# Patient Record
Sex: MALE | Race: WHITE | NOT HISPANIC OR LATINO
[De-identification: names, ages, dates, MRNs, and addresses within clinical notes are randomized per-mention and may not be internally consistent; named-entity substitution may affect disease eponyms.]

---

## 2023-09-09 ENCOUNTER — NON-APPOINTMENT (OUTPATIENT)
Age: 83
End: 2023-09-09

## 2023-09-09 ENCOUNTER — APPOINTMENT (OUTPATIENT)
Dept: ORTHOPEDIC SURGERY | Facility: CLINIC | Age: 83
End: 2023-09-09
Payer: MEDICARE

## 2023-09-09 ENCOUNTER — INPATIENT (INPATIENT)
Facility: HOSPITAL | Age: 83
LOS: 1 days | Discharge: HOME HEALTH | DRG: 522 | End: 2023-09-11
Attending: STUDENT IN AN ORGANIZED HEALTH CARE EDUCATION/TRAINING PROGRAM | Admitting: INTERNAL MEDICINE
Payer: MEDICARE

## 2023-09-09 VITALS
RESPIRATION RATE: 19 BRPM | DIASTOLIC BLOOD PRESSURE: 75 MMHG | HEART RATE: 87 BPM | TEMPERATURE: 99 F | OXYGEN SATURATION: 97 % | SYSTOLIC BLOOD PRESSURE: 131 MMHG

## 2023-09-09 VITALS — HEIGHT: 73 IN | WEIGHT: 200 LBS | BODY MASS INDEX: 26.51 KG/M2
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
## 2023-09-09 DIAGNOSIS — S72.001A FRACTURE OF UNSPECIFIED PART OF NECK OF RIGHT FEMUR, INITIAL ENCOUNTER FOR CLOSED FRACTURE: ICD-10-CM

## 2023-09-09 PROBLEM — Z00.00 ENCOUNTER FOR PREVENTIVE HEALTH EXAMINATION: Status: ACTIVE | Noted: 2023-09-09

## 2023-09-09 LAB
ALBUMIN SERPL ELPH-MCNC: 4.4 G/DL — SIGNIFICANT CHANGE UP (ref 3.5–5.2)
ALP SERPL-CCNC: 74 U/L — SIGNIFICANT CHANGE UP (ref 30–115)
ALT FLD-CCNC: 30 U/L — SIGNIFICANT CHANGE UP (ref 0–41)
ANION GAP SERPL CALC-SCNC: 14 MMOL/L — SIGNIFICANT CHANGE UP (ref 7–14)
APTT BLD: 24.3 SEC — LOW (ref 27–39.2)
AST SERPL-CCNC: 38 U/L — SIGNIFICANT CHANGE UP (ref 0–41)
BASOPHILS # BLD AUTO: 0.08 K/UL — SIGNIFICANT CHANGE UP (ref 0–0.2)
BASOPHILS NFR BLD AUTO: 0.8 % — SIGNIFICANT CHANGE UP (ref 0–1)
BILIRUB SERPL-MCNC: 0.7 MG/DL — SIGNIFICANT CHANGE UP (ref 0.2–1.2)
BLD GP AB SCN SERPL QL: SIGNIFICANT CHANGE UP
BUN SERPL-MCNC: 23 MG/DL — HIGH (ref 10–20)
CALCIUM SERPL-MCNC: 9.3 MG/DL — SIGNIFICANT CHANGE UP (ref 8.4–10.4)
CHLORIDE SERPL-SCNC: 103 MMOL/L — SIGNIFICANT CHANGE UP (ref 98–110)
CO2 SERPL-SCNC: 22 MMOL/L — SIGNIFICANT CHANGE UP (ref 17–32)
CREAT SERPL-MCNC: 1 MG/DL — SIGNIFICANT CHANGE UP (ref 0.7–1.5)
EGFR: 76 ML/MIN/1.73M2 — SIGNIFICANT CHANGE UP
EOSINOPHIL # BLD AUTO: 0.04 K/UL — SIGNIFICANT CHANGE UP (ref 0–0.7)
EOSINOPHIL NFR BLD AUTO: 0.4 % — SIGNIFICANT CHANGE UP (ref 0–8)
GLUCOSE SERPL-MCNC: 127 MG/DL — HIGH (ref 70–99)
HCT VFR BLD CALC: 41.7 % — LOW (ref 42–52)
HGB BLD-MCNC: 14.2 G/DL — SIGNIFICANT CHANGE UP (ref 14–18)
IMM GRANULOCYTES NFR BLD AUTO: 0.4 % — HIGH (ref 0.1–0.3)
INR BLD: 0.97 RATIO — SIGNIFICANT CHANGE UP (ref 0.65–1.3)
LYMPHOCYTES # BLD AUTO: 0.57 K/UL — LOW (ref 1.2–3.4)
LYMPHOCYTES # BLD AUTO: 6 % — LOW (ref 20.5–51.1)
MCHC RBC-ENTMCNC: 34 PG — HIGH (ref 27–31)
MCHC RBC-ENTMCNC: 34.1 G/DL — SIGNIFICANT CHANGE UP (ref 32–37)
MCV RBC AUTO: 99.8 FL — HIGH (ref 80–94)
MONOCYTES # BLD AUTO: 0.83 K/UL — HIGH (ref 0.1–0.6)
MONOCYTES NFR BLD AUTO: 8.7 % — SIGNIFICANT CHANGE UP (ref 1.7–9.3)
NEUTROPHILS # BLD AUTO: 7.94 K/UL — HIGH (ref 1.4–6.5)
NEUTROPHILS NFR BLD AUTO: 83.7 % — HIGH (ref 42.2–75.2)
NRBC # BLD: 0 /100 WBCS — SIGNIFICANT CHANGE UP (ref 0–0)
PLATELET # BLD AUTO: 175 K/UL — SIGNIFICANT CHANGE UP (ref 130–400)
PMV BLD: 11.1 FL — HIGH (ref 7.4–10.4)
POTASSIUM SERPL-MCNC: 4.6 MMOL/L — SIGNIFICANT CHANGE UP (ref 3.5–5)
POTASSIUM SERPL-SCNC: 4.6 MMOL/L — SIGNIFICANT CHANGE UP (ref 3.5–5)
PROT SERPL-MCNC: 7.4 G/DL — SIGNIFICANT CHANGE UP (ref 6–8)
PROTHROM AB SERPL-ACNC: 11 SEC — SIGNIFICANT CHANGE UP (ref 9.95–12.87)
RBC # BLD: 4.18 M/UL — LOW (ref 4.7–6.1)
RBC # FLD: 13.7 % — SIGNIFICANT CHANGE UP (ref 11.5–14.5)
SODIUM SERPL-SCNC: 139 MMOL/L — SIGNIFICANT CHANGE UP (ref 135–146)
WBC # BLD: 9.5 K/UL — SIGNIFICANT CHANGE UP (ref 4.8–10.8)
WBC # FLD AUTO: 9.5 K/UL — SIGNIFICANT CHANGE UP (ref 4.8–10.8)

## 2023-09-09 PROCEDURE — 88305 TISSUE EXAM BY PATHOLOGIST: CPT

## 2023-09-09 PROCEDURE — 85027 COMPLETE CBC AUTOMATED: CPT

## 2023-09-09 PROCEDURE — 93010 ELECTROCARDIOGRAM REPORT: CPT

## 2023-09-09 PROCEDURE — 97165 OT EVAL LOW COMPLEX 30 MIN: CPT | Mod: GO

## 2023-09-09 PROCEDURE — 80053 COMPREHEN METABOLIC PANEL: CPT

## 2023-09-09 PROCEDURE — 71045 X-RAY EXAM CHEST 1 VIEW: CPT | Mod: 26

## 2023-09-09 PROCEDURE — 97162 PT EVAL MOD COMPLEX 30 MIN: CPT | Mod: GP

## 2023-09-09 PROCEDURE — C1776: CPT

## 2023-09-09 PROCEDURE — 73502 X-RAY EXAM HIP UNI 2-3 VIEWS: CPT | Mod: 26,RT

## 2023-09-09 PROCEDURE — 73502 X-RAY EXAM HIP UNI 2-3 VIEWS: CPT | Mod: RT

## 2023-09-09 PROCEDURE — 88311 DECALCIFY TISSUE: CPT

## 2023-09-09 PROCEDURE — 73552 X-RAY EXAM OF FEMUR 2/>: CPT | Mod: 26,RT

## 2023-09-09 PROCEDURE — 36415 COLL VENOUS BLD VENIPUNCTURE: CPT

## 2023-09-09 PROCEDURE — 99222 1ST HOSP IP/OBS MODERATE 55: CPT

## 2023-09-09 PROCEDURE — 99203 OFFICE O/P NEW LOW 30 MIN: CPT

## 2023-09-09 PROCEDURE — 73562 X-RAY EXAM OF KNEE 3: CPT | Mod: 26,RT

## 2023-09-09 PROCEDURE — 99285 EMERGENCY DEPT VISIT HI MDM: CPT

## 2023-09-09 RX ORDER — HEPARIN SODIUM 5000 [USP'U]/ML
5000 INJECTION INTRAVENOUS; SUBCUTANEOUS EVERY 8 HOURS
Refills: 0 | Status: DISCONTINUED | OUTPATIENT
Start: 2023-09-09 | End: 2023-09-10

## 2023-09-09 RX ORDER — MORPHINE SULFATE 50 MG/1
2 CAPSULE, EXTENDED RELEASE ORAL EVERY 6 HOURS
Refills: 0 | Status: DISCONTINUED | OUTPATIENT
Start: 2023-09-09 | End: 2023-09-10

## 2023-09-09 RX ORDER — THIAMINE MONONITRATE (VIT B1) 100 MG
100 TABLET ORAL EVERY 24 HOURS
Refills: 0 | Status: DISCONTINUED | OUTPATIENT
Start: 2023-09-09 | End: 2023-09-10

## 2023-09-09 RX ORDER — FOLIC ACID 0.8 MG
1 TABLET ORAL DAILY
Refills: 0 | Status: DISCONTINUED | OUTPATIENT
Start: 2023-09-09 | End: 2023-09-10

## 2023-09-09 RX ORDER — OXYCODONE AND ACETAMINOPHEN 5; 325 MG/1; MG/1
1 TABLET ORAL EVERY 6 HOURS
Refills: 0 | Status: DISCONTINUED | OUTPATIENT
Start: 2023-09-09 | End: 2023-09-10

## 2023-09-09 RX ORDER — SODIUM CHLORIDE 9 MG/ML
1000 INJECTION INTRAMUSCULAR; INTRAVENOUS; SUBCUTANEOUS
Refills: 0 | Status: DISCONTINUED | OUTPATIENT
Start: 2023-09-09 | End: 2023-09-10

## 2023-09-09 NOTE — ED PROVIDER NOTE - CLINICAL SUMMARY MEDICAL DECISION MAKING FREE TEXT BOX
82-year-old male with no significant past medical history presents after fall last night onto right hip.  Went to urgent care this morning and found to have hip fracture.  Has pain in his right hip and thigh but nowhere else.  Denies hitting his head or anticoagulation use.  No headache, neck pain, back pain, chest pain, flank pain, abdominal pain, shortness of breath, arm pain or pain below his knee.  On exam nontoxic, vital signs noted, normocephalic, atraumatic, C-spine nontender and remainder spine nontender no step-offs, lungs clear bilaterally, heart regular no murmurs, abdomen benign, chest wall and flank nontender, right elbow with superficial abrasion but full range of motion nontender and neurovascular intact distally.  Mild tenderness proximal right femur with no expanding hematoma or severe deformity.  2+ DP pulse distally.  No tenderness to distal thigh or knee.  X-ray with proximal femur fracture.  No other fractures on my independent interpretation.  Fast negative.  Head, C-spine, chest, abdomen cleared clinically.  EKG normal sinus rhythm, normal axis, nonspecific inferior T wave/ST abnormality, no ST depression or elevation, poor R wave progression, normal intervals.  Discussed with orthopedics and they recommend admitting to [facility] for surgery tomorrow

## 2023-09-09 NOTE — ED ADULT NURSE NOTE - CHIEF COMPLAINT QUOTE
pt bibems for fall last night at 8pm, pt states he fell down 2 steps. went to urgent care today and was told he has a femur fx. Not on AC, denies HT or LOC. Unable to ambulate after fall.

## 2023-09-09 NOTE — DISCUSSION/SUMMARY
[de-identified] : I reviewed the x-ray findings with the patient at length.  At this time, an ambulette was called and it was recommended that the patient go to [facility] for admission for an ORIF of the right hip.  The patient understands that he should currently remain nonweightbearing in the right lower extremity.  We did discuss the procedures as well as the recovery and risks and benefits.  The patient was advised that he will meet with the surgeon in the hospital and have more details given as well.  He will follow-up in our office as a postop patient after his procedure is completed.  The family and patient verbalized understanding and consent to this treatment plan.  All of their questions were answered.

## 2023-09-09 NOTE — ED ADULT NURSE NOTE - OBJECTIVE STATEMENT
patient sent from urgent care for right side femur fx. pt states he tripped on the steps last night and fell onto the floor on his right side. had difficulty walking. denies any head trauma/loc/bt

## 2023-09-09 NOTE — ED PROVIDER NOTE - PHYSICAL EXAMINATION
CONSTITUTIONAL:  NAD;   SKIN:  warm, dry;   HEAD:  NCAT;   EYES:  NL inspection; PERRLA, EOMI, no periorbital ecchymosis  ENT:  MMM; no nasal septal hematoma, no epistaxis, no cruz sign bilaterally,  NECK: supple; normal ROM; no C/T/L spine midline TTP, step-offs, or deformities  CARD:  RRR;   RESP:  CTAB; distal pulses 2+ X4 extremities  ABD:  S/NT, no R/G;   MSK: + mild TTP to inner aspect of right proximal upper leg with associated edema, + right knee anterior aspect small bruise with no TTP, no deformity, otherwise no extremity TTP/loss of ROM/injury/deformity;   NEURO: A&Ox3; CN II - XII intact; strength 5/5 and sensation grossly intact x4 extremities except for right hip (limited by pain); no somnolence  PSYCH:  cooperative, appropriate;

## 2023-09-09 NOTE — DATA REVIEWED
[FreeTextEntry1] : X-rays were obtained today of the patient's right hip and pelvis.  It shows an impacted/displaced femoral neck fracture.  These findings were discussed with the patient and his son.

## 2023-09-09 NOTE — CONSULT NOTE ADULT - ASSESSMENT
Orthopaedic Surgery Consult Note    81yo Male no PMH presents with right femoral neck fracture s/p mechanical  fall. Patient fell on his right hip last night while walking down stairs. Landed on right hip, immediate groin pain, swelling, inability to ambulate. Denies head trauma, LOC, prodromal symptoms. Denies pain anywhere else. Fairly active for age, states he power walks. Ambulates without any assistive devices. Denies numbness and tingling.     PMH/PSH  MEWS Score    No pertinent past medical history    Closed displaced fracture of right femoral neck    FEMUR FRACTURE    Fall at home, initial encounter    SysAdmin_VisitLink        Medications      Home Medications:        Allergies  No Known Allergies        T(C): 37.2 (09-09-23 @ 14:29), Max: 37.2 (09-09-23 @ 14:29)  HR: 87 (09-09-23 @ 14:29) (87 - 87)  BP: 131/75 (09-09-23 @ 14:29) (131/75 - 131/75)  RR: 19 (09-09-23 @ 14:29) (19 - 19)  SpO2: 97% (09-09-23 @ 14:29) (97% - 97%)    P/E:  AOx3, NAD  Non-labored breathing    RLE  Skin intact  Shortened, externally rotated  TTP over groin  ROM hip limited by tri  Compartments soft and compressible, no pain w/ passive stretch of digits  SILT SP/DP/T/Sural/Saph  Firing TA/EHL/FHL/GS  DP pulse 2+, cap refill <2        Labs                        14.2   9.50  )-----------( 175      ( 09 Sep 2023 15:03 )             41.7     09-09    139  |  103  |  23<H>  ----------------------------<  127<H>  4.6   |  22  |  1.0    Ca    9.3      09 Sep 2023 15:03    TPro  7.4  /  Alb  4.4  /  TBili  0.7  /  DBili  x   /  AST  38  /  ALT  30  /  AlkPhos  74  09-09    LIVER FUNCTIONS - ( 09 Sep 2023 15:03 )  Alb: 4.4 g/dL / Pro: 7.4 g/dL / ALK PHOS: 74 U/L / ALT: 30 U/L / AST: 38 U/L / GGT: x           PT/INR - ( 09 Sep 2023 15:03 )   PT: 11.00 sec;   INR: 0.97 ratio         PTT - ( 09 Sep 2023 15:03 )  PTT:24.3 sec    Imaging:  XR R hip  displaced, femoral neck farctute    A/P:  81yo Male w/ right displaced femoral neck fracture. Patient indicated for hemiarthroplasty vs total hip arthroplasty    Discussed with Dr. Garay that he will perform surgery tomorrow at [facility]. Please [place]  Admit to medicine/trauma  Weight bearing: NWB RLE  Pain control  Ice/elevation  NPO at midnight with IV fluids  Please obtain preop labs: bmp, cbc, coags, type and screen x2, ekg, cxr  Add on for Dr. Garay 9/10 for right hip hemiarthroplasty vs YULISA

## 2023-09-09 NOTE — ED PROVIDER NOTE - CARE PLAN
1 Principal Discharge DX:	Closed displaced fracture of right femoral neck  Secondary Diagnosis:	Fall at home, initial encounter

## 2023-09-09 NOTE — PHYSICAL EXAM
[de-identified] : On examination of his right lower extremity, the patient is currently sitting in a wheelchair, and is unable to get up onto the exam table secondary to pain.  He has no obvious edema, ecchymosis, or deformity noted along his right groin.  Skin is intact.  He has point tenderness with deep palpation into the right groin.  He has some tenderness palpation along the medial side of his thigh.  He has no direct tenderness to palpation along the anterior thigh or lateral thigh.  He has no tenderness palpation along his knee.  He has difficulty actively straight leg raising secondary to pain along his groin.  He can extend his knee without any discomfort in the knee, but no discomfort along his groin.  He has pain with any attempted passive range of motion of the right hip while sitting in the wheelchair.  His hip exam was limited secondary to pain and his inability to get up onto the exam table.  He otherwise had no tenderness palpation along the femur.  No tenderness along the patella facets.  No tenderness along the tibial plateau.  Calf was soft and nontender.  No tenderness along the fibular head.  Neurovascular intact distally.

## 2023-09-09 NOTE — HISTORY OF PRESENT ILLNESS
[de-identified] : Patient presents today for evaluation of his right hip/right lower extremity.  He states that yesterday, he missed a step, and fell down 1 step.  He landed on his right knee/right thigh, but he also recalls landing in a somewhat of a split position, resulting in a pull on his right groin. Since then, its been difficult for him to bear full weight on the right lower extremity and move his right leg.  He denies any previous problems in the right lower extremity.

## 2023-09-09 NOTE — H&P ADULT - NSHPLABSRESULTS_GEN_ALL_CORE
14.2   9.50  )-----------( 175      ( 09 Sep 2023 15:03 )             41.7   09-09    139  |  103  |  23<H>  ----------------------------<  127<H>  4.6   |  22  |  1.0    Ca    9.3      09 Sep 2023 15:03    TPro  7.4  /  Alb  4.4  /  TBili  0.7  /  DBili  x   /  AST  38  /  ALT  30  /  AlkPhos  74  09-09    PT/INR - ( 09 Sep 2023 15:03 )   PT: 11.00 sec;   INR: 0.97 ratio    PTT - ( 09 Sep 2023 15:03 )  PTT:24.3 sec  EKG - sinus w/ NS changes, age indeterminate infarct     < from: Xray Femur 2 Views, Right (09.09.23 @ 15:48) >  Impression:  Acute fracture right femoral neck.  --- End of Report ---  ELINA SINGH MD; Attending Radiologist  This document has been electronically signed. Sep  9 2023  4:36PM  < end of copied text >    < from: Xray Chest 1 View- PORTABLE-Urgent (Xray Chest 1 View- PORTABLE-Urgent .) (09.09.23 @ 15:48) >  Impression:  No radiographic evidence of cardiopulmonary disease.  --- End of Report ---  ELINA SINGH MD; Attending Radiologist  This document has been electronically signed. Sep  9 2023  4:37PM    < end of copied text > 14.2   9.50  )-----------( 175      ( 09 Sep 2023 15:03 )             41.7   09-09    139  |  103  |  23<H>  ----------------------------<  127<H>  4.6   |  22  |  1.0    Ca    9.3      09 Sep 2023 15:03    TPro  7.4  /  Alb  4.4  /  TBili  0.7  /  DBili  x   /  AST  38  /  ALT  30  /  AlkPhos  74  09-09    PT/INR - ( 09 Sep 2023 15:03 )   PT: 11.00 sec;   INR: 0.97 ratio    PTT - ( 09 Sep 2023 15:03 )  PTT:24.3 sec  EKG - NSR @78 w/ NS changes,?? age undetermined infarct -Q waves    < from: Xray Femur 2 Views, Right (09.09.23 @ 15:48) >  Impression:  Acute fracture right femoral neck.  --- End of Report ---  ELINA SINGH MD; Attending Radiologist  This document has been electronically signed. Sep  9 2023  4:36PM  < end of copied text >    < from: Xray Chest 1 View- PORTABLE-Urgent (Xray Chest 1 View- PORTABLE-Urgent .) (09.09.23 @ 15:48) >  Impression:  No radiographic evidence of cardiopulmonary disease.  --- End of Report ---  ELINA SINGH MD; Attending Radiologist  This document has been electronically signed. Sep  9 2023  4:37PM    < end of copied text >

## 2023-09-09 NOTE — H&P ADULT - HISTORY OF PRESENT ILLNESS
· HPI Objective Statement: Patient is an 82M with no PMHx sent to ED via EMS from outpatient orthopedic office for evaluation and management of right femoral neck fracture, displaced and impacted. Patient states that at 2015 last night he was walking down the stairs inside his house with the lights off and got to the 2nd to last step when he caught his toe, tripped, and fell, landing on his right side. Patient not hit his head, no LOC, no AC use, no upper extremity or left lower extremity injury. Patient reports having pain to his right groin/proximal thigh immediately afterward but states that he was able to ambulate back up the stairs into bed with considerable assistance from his wife. When he woke today the pain was worse and he was unable to ambulate, prompting him to present to outside orthopedic office where x-rays were performed showing the fracture. No other injuries, patient denies pain except when he is moving.

## 2023-09-09 NOTE — ED ADULT TRIAGE NOTE - CHIEF COMPLAINT QUOTE
pt bibems for fall last night at 8pm, pt states he fell down 2 steps. went to urgent care today and was told he has a femur fx. Not on AC, denies HT or LOC. pt bibems for fall last night at 8pm, pt states he fell down 2 steps. went to urgent care today and was told he has a femur fx. Not on AC, denies HT or LOC. Unable to ambulate after fall.

## 2023-09-09 NOTE — ED PROVIDER NOTE - OBJECTIVE STATEMENT
Patient is an 82M with no PMHx sent to ED via EMS from outpatient orthopedic office for evaluation and management of right femoral neck fracture, displaced and impacted. Patient states that at 2015 last night he was walking down the stairs inside his house with the lights off and got to the 2nd to last step when he caught his toe, tripped, and fell, landing on his right side. Patient not hit his head, no LOC, no AC use, no upper extremity or left lower extremity injury. Patient reports having pain to his right groin/proximal thigh immediately afterward but states that he was able to ambulate back up the stairs into bed with considerable assistance from his wife. When he woke today the pain was worse and he was unable to ambulate, prompting him to present to outside orthopedic office where x-rays were performed showing the fracture. No other injuries, patient denies pain except when he is moving.

## 2023-09-09 NOTE — ED PROVIDER NOTE - PROGRESS NOTE DETAILS
TD: Consulted orthopedics, discussed patient's case with resident who states he will discuss with attending and then call back.

## 2023-09-09 NOTE — H&P ADULT - ASSESSMENT
81 y/o male w/ No PMH but w/ daily alcohol use being admitted for treatment of R femoral neck fracture. Patient seen by the Orthopaedic surgeon today in his office and referral to the ED for admission and surgery   RCRI - Class I -3.9 % 30-day risk of death, MI, or cardiac arrest  Patient is at low risk for pal-op event while undergoing a moderate risk procedure.   R hip fracture - May proceed to OR  -NPO after MN   -IV fluid - maintenance   -PRN pain Rx  -DVT prophylaxis     Suspected alcohol use disorder w/o signs or symptoms of intoxication or withdrawal   -patient counselled ref recommended acceptable limits of consumption (CAGE- neg)  -MVI/Thiamine / Folic acid     DVT prophylaxis     Patient declined to have next of kin/family contacted - 'they are already sleeping'    Case d/w [facility] Site Hospitalist / ED Physician / Transfer center/ Nurse gave report to facilitate transfer   to [facility].

## 2023-09-09 NOTE — ED ADULT NURSE NOTE - NSFALLHARMRISKINTERV_ED_ALL_ED
Assistance OOB with selected safe patient handling equipment if applicable/Assistance with ambulation/Communicate risk of Fall with Harm to all staff, patient, and family/Monitor gait and stability/Provide visual cue: red socks, yellow wristband, yellow gown, etc/Reinforce activity limits and safety measures with patient and family/Bed in lowest position, wheels locked, appropriate side rails in place/Call bell, personal items and telephone in reach/Instruct patient to call for assistance before getting out of bed/chair/stretcher/Non-slip footwear applied when patient is off stretcher/Treichlers to call system/Physically safe environment - no spills, clutter or unnecessary equipment/Purposeful Proactive Rounding/Room/bathroom lighting operational, light cord in reach

## 2023-09-10 ENCOUNTER — RESULT REVIEW (OUTPATIENT)
Age: 83
End: 2023-09-10

## 2023-09-10 LAB
ALBUMIN SERPL ELPH-MCNC: 4.3 G/DL — SIGNIFICANT CHANGE UP (ref 3.5–5.2)
ALP SERPL-CCNC: 68 U/L — SIGNIFICANT CHANGE UP (ref 30–115)
ALT FLD-CCNC: 24 U/L — SIGNIFICANT CHANGE UP (ref 0–41)
ANION GAP SERPL CALC-SCNC: 14 MMOL/L — SIGNIFICANT CHANGE UP (ref 7–14)
AST SERPL-CCNC: 38 U/L — SIGNIFICANT CHANGE UP (ref 0–41)
BILIRUB SERPL-MCNC: 0.8 MG/DL — SIGNIFICANT CHANGE UP (ref 0.2–1.2)
BUN SERPL-MCNC: 17 MG/DL — SIGNIFICANT CHANGE UP (ref 10–20)
CALCIUM SERPL-MCNC: 8.8 MG/DL — SIGNIFICANT CHANGE UP (ref 8.4–10.5)
CHLORIDE SERPL-SCNC: 105 MMOL/L — SIGNIFICANT CHANGE UP (ref 98–110)
CO2 SERPL-SCNC: 20 MMOL/L — SIGNIFICANT CHANGE UP (ref 17–32)
CREAT SERPL-MCNC: 0.8 MG/DL — SIGNIFICANT CHANGE UP (ref 0.7–1.5)
EGFR: 89 ML/MIN/1.73M2 — SIGNIFICANT CHANGE UP
GLUCOSE SERPL-MCNC: 106 MG/DL — HIGH (ref 70–99)
HCT VFR BLD CALC: 43.2 % — SIGNIFICANT CHANGE UP (ref 42–52)
HGB BLD-MCNC: 14.1 G/DL — SIGNIFICANT CHANGE UP (ref 14–18)
MCHC RBC-ENTMCNC: 32.6 G/DL — SIGNIFICANT CHANGE UP (ref 32–37)
MCHC RBC-ENTMCNC: 32.6 PG — HIGH (ref 27–31)
MCV RBC AUTO: 99.8 FL — HIGH (ref 80–94)
NRBC # BLD: 0 /100 WBCS — SIGNIFICANT CHANGE UP (ref 0–0)
PLATELET # BLD AUTO: 147 K/UL — SIGNIFICANT CHANGE UP (ref 130–400)
PMV BLD: 11.6 FL — HIGH (ref 7.4–10.4)
POTASSIUM SERPL-MCNC: 5.4 MMOL/L — HIGH (ref 3.5–5)
POTASSIUM SERPL-SCNC: 5.4 MMOL/L — HIGH (ref 3.5–5)
PROT SERPL-MCNC: 6.5 G/DL — SIGNIFICANT CHANGE UP (ref 6–8)
RBC # BLD: 4.33 M/UL — LOW (ref 4.7–6.1)
RBC # FLD: 13.7 % — SIGNIFICANT CHANGE UP (ref 11.5–14.5)
SODIUM SERPL-SCNC: 139 MMOL/L — SIGNIFICANT CHANGE UP (ref 135–146)
WBC # BLD: 7.4 K/UL — SIGNIFICANT CHANGE UP (ref 4.8–10.8)
WBC # FLD AUTO: 7.4 K/UL — SIGNIFICANT CHANGE UP (ref 4.8–10.8)

## 2023-09-10 PROCEDURE — 88311 DECALCIFY TISSUE: CPT | Mod: 26

## 2023-09-10 PROCEDURE — 27130 TOTAL HIP ARTHROPLASTY: CPT | Mod: RT

## 2023-09-10 PROCEDURE — 88305 TISSUE EXAM BY PATHOLOGIST: CPT | Mod: 26

## 2023-09-10 PROCEDURE — 99232 SBSQ HOSP IP/OBS MODERATE 35: CPT

## 2023-09-10 PROCEDURE — 27130 TOTAL HIP ARTHROPLASTY: CPT | Mod: AS,RT

## 2023-09-10 RX ORDER — ENOXAPARIN SODIUM 100 MG/ML
40 INJECTION SUBCUTANEOUS EVERY 12 HOURS
Refills: 0 | Status: DISCONTINUED | OUTPATIENT
Start: 2023-09-10 | End: 2023-09-11

## 2023-09-10 RX ORDER — KETOROLAC TROMETHAMINE 30 MG/ML
15 SYRINGE (ML) INJECTION EVERY 6 HOURS
Refills: 0 | Status: DISCONTINUED | OUTPATIENT
Start: 2023-09-10 | End: 2023-09-11

## 2023-09-10 RX ORDER — POLYETHYLENE GLYCOL 3350 17 G/17G
17 POWDER, FOR SOLUTION ORAL AT BEDTIME
Refills: 0 | Status: DISCONTINUED | OUTPATIENT
Start: 2023-09-10 | End: 2023-09-11

## 2023-09-10 RX ORDER — SENNA PLUS 8.6 MG/1
2 TABLET ORAL AT BEDTIME
Refills: 0 | Status: DISCONTINUED | OUTPATIENT
Start: 2023-09-10 | End: 2023-09-10

## 2023-09-10 RX ORDER — POLYETHYLENE GLYCOL 3350 17 G/17G
17 POWDER, FOR SOLUTION ORAL DAILY
Refills: 0 | Status: DISCONTINUED | OUTPATIENT
Start: 2023-09-10 | End: 2023-09-10

## 2023-09-10 RX ORDER — ACETAMINOPHEN 500 MG
650 TABLET ORAL EVERY 6 HOURS
Refills: 0 | Status: DISCONTINUED | OUTPATIENT
Start: 2023-09-10 | End: 2023-09-11

## 2023-09-10 RX ORDER — ONDANSETRON 8 MG/1
4 TABLET, FILM COATED ORAL EVERY 6 HOURS
Refills: 0 | Status: DISCONTINUED | OUTPATIENT
Start: 2023-09-10 | End: 2023-09-11

## 2023-09-10 RX ORDER — TRAMADOL HYDROCHLORIDE 50 MG/1
50 TABLET ORAL EVERY 4 HOURS
Refills: 0 | Status: DISCONTINUED | OUTPATIENT
Start: 2023-09-10 | End: 2023-09-11

## 2023-09-10 RX ORDER — HYDROMORPHONE HYDROCHLORIDE 2 MG/ML
0.5 INJECTION INTRAMUSCULAR; INTRAVENOUS; SUBCUTANEOUS
Refills: 0 | Status: DISCONTINUED | OUTPATIENT
Start: 2023-09-10 | End: 2023-09-10

## 2023-09-10 RX ORDER — PANTOPRAZOLE SODIUM 20 MG/1
40 TABLET, DELAYED RELEASE ORAL
Refills: 0 | Status: DISCONTINUED | OUTPATIENT
Start: 2023-09-10 | End: 2023-09-11

## 2023-09-10 RX ORDER — SODIUM CHLORIDE 9 MG/ML
1000 INJECTION INTRAMUSCULAR; INTRAVENOUS; SUBCUTANEOUS
Refills: 0 | Status: DISCONTINUED | OUTPATIENT
Start: 2023-09-10 | End: 2023-09-11

## 2023-09-10 RX ORDER — INFLUENZA VIRUS VACCINE 15; 15; 15; 15 UG/.5ML; UG/.5ML; UG/.5ML; UG/.5ML
0.7 SUSPENSION INTRAMUSCULAR ONCE
Refills: 0 | Status: DISCONTINUED | OUTPATIENT
Start: 2023-09-10 | End: 2023-09-10

## 2023-09-10 RX ORDER — SODIUM CHLORIDE 9 MG/ML
1000 INJECTION, SOLUTION INTRAVENOUS
Refills: 0 | Status: DISCONTINUED | OUTPATIENT
Start: 2023-09-10 | End: 2023-09-10

## 2023-09-10 RX ORDER — SENNA PLUS 8.6 MG/1
2 TABLET ORAL AT BEDTIME
Refills: 0 | Status: DISCONTINUED | OUTPATIENT
Start: 2023-09-10 | End: 2023-09-11

## 2023-09-10 RX ORDER — OXYCODONE HYDROCHLORIDE 5 MG/1
5 TABLET ORAL EVERY 4 HOURS
Refills: 0 | Status: DISCONTINUED | OUTPATIENT
Start: 2023-09-10 | End: 2023-09-11

## 2023-09-10 RX ORDER — MAGNESIUM HYDROXIDE 400 MG/1
30 TABLET, CHEWABLE ORAL DAILY
Refills: 0 | Status: DISCONTINUED | OUTPATIENT
Start: 2023-09-10 | End: 2023-09-11

## 2023-09-10 RX ORDER — CELECOXIB 200 MG/1
200 CAPSULE ORAL EVERY 12 HOURS
Refills: 0 | Status: DISCONTINUED | OUTPATIENT
Start: 2023-09-11 | End: 2023-09-11

## 2023-09-10 RX ORDER — CEFAZOLIN SODIUM 1 G
2000 VIAL (EA) INJECTION EVERY 8 HOURS
Refills: 0 | Status: COMPLETED | OUTPATIENT
Start: 2023-09-10 | End: 2023-09-11

## 2023-09-10 RX ADMIN — SODIUM CHLORIDE 75 MILLILITER(S): 9 INJECTION INTRAMUSCULAR; INTRAVENOUS; SUBCUTANEOUS at 06:32

## 2023-09-10 RX ADMIN — ENOXAPARIN SODIUM 40 MILLIGRAM(S): 100 INJECTION SUBCUTANEOUS at 17:50

## 2023-09-10 RX ADMIN — Medication 100 MILLIGRAM(S): at 21:21

## 2023-09-10 RX ADMIN — Medication 15 MILLIGRAM(S): at 17:50

## 2023-09-10 RX ADMIN — Medication 100 MILLIGRAM(S): at 06:32

## 2023-09-10 RX ADMIN — SODIUM CHLORIDE 100 MILLILITER(S): 9 INJECTION INTRAMUSCULAR; INTRAVENOUS; SUBCUTANEOUS at 17:52

## 2023-09-10 RX ADMIN — Medication 650 MILLIGRAM(S): at 17:50

## 2023-09-10 NOTE — CHART NOTE - NSCHARTNOTEFT_GEN_A_CORE
PACU ANESTHESIA ADMISSION NOTE      Procedure: right total hip arthroplasty    ____  Intubated  TV:______       Rate: ______      FiO2: ______    ___x_  Patent Airway    __x__  Full return of protective reflexes    __x__  Full recovery from anesthesia / back to baseline     Vitals:   T:     99   R:   15              BP:   121/57              Sat:        98         P: 90      Mental Status:  ___x_ Awake   _____ Alert   _____ Drowsy   _____ Sedated    Nausea/Vomiting:  __x__ NO  ______Yes,   See Post - Op Orders          Pain Scale (0-10):  _0____    Treatment: ____ None    ____ See Post - Op/PCA Orders    Post - Operative Fluids:   ____ Oral   ___x_ See Post - Op Orders    Plan: Discharge:   ___Home       _x____Floor     _____Critical Care    _____  Other:_________________    Comments:

## 2023-09-10 NOTE — PRE-ANESTHESIA EVALUATION ADULT - BP NONINVASIVE DIASTOLIC (MM HG)
70 Initiate Treatment: Bionect 0.2% cream BID Continue Regimen: Epiduo QHS\\nTargadox 2 tablets PO QD Detail Level: Zone Render In Strict Bullet Format?: No

## 2023-09-10 NOTE — PRE PROCEDURE NOTE - PRE PROCEDURE EVALUATION
Orthopaedic Surgery Preop Note      Planned procedure: RIGHT HIP HEMIARTHROPLASTY VS TOTAL HIP ARTHROPLASTY      Consent: R/B/A discussed w/ pt/pt's family who expressed understanding and wished to proceed w/ surgery. Consent documented and signed appropriately.      Consults/clearances:   Medicine  Anesthesia      Vitals:  T(C): 36.1 (09-10-23 @ 05:00), Max: 37.2 (09-09-23 @ 14:29)  HR: 73 (09-10-23 @ 05:00) (67 - 87)  BP: 139/70 (09-10-23 @ 05:00) (129/65 - 140/71)  RR: 18 (09-10-23 @ 05:00) (17 - 19)  SpO2: 94% (09-10-23 @ 05:00) (94% - 98%)        NPO @ MN, IVF  CXR: see PACS  EKG: see chart  Type and screen x2: done   2 units prbc on hold  Blood bank called:    Labs                        14.2   9.50  )-----------( 175      ( 09 Sep 2023 15:03 )             41.7     09-09    139  |  103  |  23<H>  ----------------------------<  127<H>  4.6   |  22  |  1.0    Ca    9.3      09 Sep 2023 15:03    TPro  7.4  /  Alb  4.4  /  TBili  0.7  /  DBili  x   /  AST  38  /  ALT  30  /  AlkPhos  74  09-09    LIVER FUNCTIONS - ( 09 Sep 2023 15:03 )  Alb: 4.4 g/dL / Pro: 7.4 g/dL / ALK PHOS: 74 U/L / ALT: 30 U/L / AST: 38 U/L / GGT: x           PT/INR - ( 09 Sep 2023 15:03 )   PT: 11.00 sec;   INR: 0.97 ratio         PTT - ( 09 Sep 2023 15:03 )  PTT:24.3 sec    Anticoagulation status (include name of anticaogulant):  Please hold Lovenox after midnight    A/P    [ ] NPO and IVF @ midnight  [ ] pain control/analgesia prn per primary team  [ ] Incentive Spirometery  [ ] Notify Ortho with any questions - spectra [phone]    [ ] DISCUSSED WITH PRIMARY TEAM MEMBER (name of team member): PATI  [ ] Date and TIME DISCUSSED WITH PRIMARY TEAM MEMBER: 9/9/23 at 10 PM

## 2023-09-10 NOTE — CHART NOTE - NSCHARTNOTEFT_GEN_A_CORE
per Dr. Falguni Chauhan, patient's hip surgery rescheduled for tomorrow  will start diet and make npo after midnight

## 2023-09-10 NOTE — PROGRESS NOTE ADULT - ASSESSMENT
81 y/o male w/ No PMH but w/ daily alcohol use being admitted for treatment of R femoral neck fracture. Patient seen by the Orthopaedic surgeon today in his office and referral to the ED for admission and surgery.    #Right displaced femoral neck fracture  -NPO for OR today   -IV fluid - maintenance   -PRN pain Rx  -DVT prophylaxis once back from OR      #Suspected alcohol use disorder w/o signs or symptoms of intoxication or withdrawal   -patient counselled ref recommended acceptable limits of consumption (CAGE- neg)  -MVI/Thiamine / Folic acid     #DVT prophylaxis: holding for OR, restart when cleared by ortho       #Progress Note Handoff  Pending (specify):  ortho procedure today, PT/Rehab after   Family discussion: Patient well-informed and engaged in their care. In agreement. Wife Hattie at bedside.   Disposition: Acute

## 2023-09-10 NOTE — PROGRESS NOTE ADULT - SUBJECTIVE AND OBJECTIVE BOX
RADHA MIMS  [facility]I [id number] ([facility])      Patient is a 82y old  Male who presents with a chief complaint of       Interval events:  Patient seen and examined at bedside. No acute events overnight. Denies pain. NPO for ortho procedure today.     -PMHx: No pertinent past medical history      -PSHx:        REVIEW OF SYSTEMS:  CONSTITUTIONAL: No fever, weight loss, or fatigue  RESPIRATORY: No cough, wheezing, chills or hemoptysis; No shortness of breath  CARDIOVASCULAR: No chest pain, palpitations, dizziness, or leg swelling  GASTROINTESTINAL: No abdominal or epigastric pain. No nausea, vomiting, or hematemesis; No diarrhea or constipation. No melena or hematochezia.  NEUROLOGICAL: No headaches  LYMPH NODES: No enlarged glands  MUSCULOSKELETAL: No joint pain or swelling; No muscle, back, or extremity pain      T(C): , Max: 37.2 (09-09-23 @ 14:29)  HR: 73 (09-10-23 @ 05:00)  BP: 139/70 (09-10-23 @ 05:00)  RR: 18 (09-10-23 @ 05:00)  SpO2: 94% (09-10-23 @ 05:00)  CAPILLARY BLOOD GLUCOSE          PHYSICAL EXAM:  General: WN/WD NAD  Neurology: A&Ox3, nonfocal, follows commands  Eyes: PERRLA/ EOMI  ENT/Neck: Neck supple, trachea midline, No JVD  Respiratory: CTA B/L, No wheezing, rales, rhonchi  CV: Normal rate regular rhythm, S1S2, no murmurs, rubs or gallops  Abdominal: Soft, NT, ND +BS,   Extremities: No edema, + peripheral pulses, R LE externally rotated and shortened w/ pain if moved   Skin: No Rashes, Hematoma, Ecchymosis    Consultant(s) Notes Reviewed:  [x ] YES  [ ] NO  Care Discussed with Consultants/Other Providers [ x] YES  [ ] NO      LABS:          14.1  7.40  )-------(147          43.2  N=--  L=--  MCV=99.8          14.2  9.50  )-------(175          41.7  N=83.7  L=6.0  MCV=99.8    139|103|23<H>  ------------------<127<H>  4.6|22|1.0  eGFR:--  Ca:9.3      PT/INR - ( 09 Sep 2023 15:03 )   PT: 11.00 sec;   INR: 0.97 ratio         PTT - ( 09 Sep 2023 15:03 )  PTT:24.3 sec      Microbiology:      RADIOLOGY & ADDITIONAL TESTS:  < from: Xray Knee 3 Views, Right (09.09.23 @ 15:49) >  Findings/  Impression:    No acute fracture or malalignment.     No suprapatellar effusion.    Chondrocalcinosis. Possible fibrous cortical defect distal femur.    < end of copied text >    < from: Xray Chest 1 View- PORTABLE-Urgent (Xray Chest 1 View- PORTABLE-Urgent .) (09.09.23 @ 15:48) >  Impression:    No radiographic evidence of cardiopulmonary disease.    < end of copied text >    < from: Xray Femur 2 Views, Right (09.09.23 @ 15:48) >    Findings/  Impression:    Acute fracture right femoral neck.    < end of copied text >        Medications:  bisacodyl Suppository 10 milliGRAM(s) Rectal daily PRN  folic acid 1 milliGRAM(s) Oral daily  influenza  Vaccine (HIGH DOSE) 0.7 milliLiter(s) IntraMuscular once  morphine  - Injectable 2 milliGRAM(s) IV Push every 6 hours PRN  multivitamin 1 Tablet(s) Oral daily  oxycodone    5 mG/acetaminophen 325 mG 1 Tablet(s) Oral every 6 hours PRN  polyethylene glycol 3350 17 Gram(s) Oral daily PRN  senna 2 Tablet(s) Oral at bedtime  sodium chloride 0.9%. 1000 milliLiter(s) IV Continuous <Continuous>  thiamine 100 milliGRAM(s) Oral every 24 hours

## 2023-09-10 NOTE — PATIENT PROFILE ADULT - FALL HARM RISK - HARM RISK INTERVENTIONS

## 2023-09-11 ENCOUNTER — TRANSCRIPTION ENCOUNTER (OUTPATIENT)
Age: 83
End: 2023-09-11

## 2023-09-11 VITALS — SYSTOLIC BLOOD PRESSURE: 113 MMHG | HEART RATE: 95 BPM | DIASTOLIC BLOOD PRESSURE: 59 MMHG

## 2023-09-11 LAB
ALBUMIN SERPL ELPH-MCNC: 3.5 G/DL — SIGNIFICANT CHANGE UP (ref 3.5–5.2)
ALP SERPL-CCNC: 54 U/L — SIGNIFICANT CHANGE UP (ref 30–115)
ALT FLD-CCNC: 19 U/L — SIGNIFICANT CHANGE UP (ref 0–41)
ANION GAP SERPL CALC-SCNC: 9 MMOL/L — SIGNIFICANT CHANGE UP (ref 7–14)
AST SERPL-CCNC: 24 U/L — SIGNIFICANT CHANGE UP (ref 0–41)
BILIRUB SERPL-MCNC: 0.4 MG/DL — SIGNIFICANT CHANGE UP (ref 0.2–1.2)
BUN SERPL-MCNC: 24 MG/DL — HIGH (ref 10–20)
CALCIUM SERPL-MCNC: 7.9 MG/DL — LOW (ref 8.4–10.5)
CHLORIDE SERPL-SCNC: 107 MMOL/L — SIGNIFICANT CHANGE UP (ref 98–110)
CO2 SERPL-SCNC: 25 MMOL/L — SIGNIFICANT CHANGE UP (ref 17–32)
CREAT SERPL-MCNC: 0.8 MG/DL — SIGNIFICANT CHANGE UP (ref 0.7–1.5)
EGFR: 89 ML/MIN/1.73M2 — SIGNIFICANT CHANGE UP
GLUCOSE SERPL-MCNC: 148 MG/DL — HIGH (ref 70–99)
HCT VFR BLD CALC: 31.7 % — LOW (ref 42–52)
HGB BLD-MCNC: 10.4 G/DL — LOW (ref 14–18)
MCHC RBC-ENTMCNC: 32.8 G/DL — SIGNIFICANT CHANGE UP (ref 32–37)
MCHC RBC-ENTMCNC: 33 PG — HIGH (ref 27–31)
MCV RBC AUTO: 100.6 FL — HIGH (ref 80–94)
NRBC # BLD: 0 /100 WBCS — SIGNIFICANT CHANGE UP (ref 0–0)
PLATELET # BLD AUTO: 113 K/UL — LOW (ref 130–400)
PMV BLD: 11 FL — HIGH (ref 7.4–10.4)
POTASSIUM SERPL-MCNC: 4.8 MMOL/L — SIGNIFICANT CHANGE UP (ref 3.5–5)
POTASSIUM SERPL-SCNC: 4.8 MMOL/L — SIGNIFICANT CHANGE UP (ref 3.5–5)
PROT SERPL-MCNC: 5.3 G/DL — LOW (ref 6–8)
RBC # BLD: 3.15 M/UL — LOW (ref 4.7–6.1)
RBC # FLD: 13.5 % — SIGNIFICANT CHANGE UP (ref 11.5–14.5)
SODIUM SERPL-SCNC: 141 MMOL/L — SIGNIFICANT CHANGE UP (ref 135–146)
WBC # BLD: 8.96 K/UL — SIGNIFICANT CHANGE UP (ref 4.8–10.8)
WBC # FLD AUTO: 8.96 K/UL — SIGNIFICANT CHANGE UP (ref 4.8–10.8)

## 2023-09-11 PROCEDURE — 99239 HOSP IP/OBS DSCHRG MGMT >30: CPT

## 2023-09-11 RX ORDER — TRAMADOL HYDROCHLORIDE 50 MG/1
1 TABLET ORAL
Qty: 42 | Refills: 0
Start: 2023-09-11 | End: 2023-09-17

## 2023-09-11 RX ORDER — ASPIRIN/CALCIUM CARB/MAGNESIUM 324 MG
1 TABLET ORAL
Qty: 60 | Refills: 0
Start: 2023-09-11 | End: 2023-10-10

## 2023-09-11 RX ORDER — ACETAMINOPHEN 500 MG
2 TABLET ORAL
Qty: 0 | Refills: 0 | DISCHARGE
Start: 2023-09-11

## 2023-09-11 RX ADMIN — Medication 650 MILLIGRAM(S): at 12:15

## 2023-09-11 RX ADMIN — Medication 100 MILLIGRAM(S): at 06:32

## 2023-09-11 RX ADMIN — ENOXAPARIN SODIUM 40 MILLIGRAM(S): 100 INJECTION SUBCUTANEOUS at 06:32

## 2023-09-11 RX ADMIN — Medication 650 MILLIGRAM(S): at 11:45

## 2023-09-11 RX ADMIN — PANTOPRAZOLE SODIUM 40 MILLIGRAM(S): 20 TABLET, DELAYED RELEASE ORAL at 06:31

## 2023-09-11 RX ADMIN — Medication 15 MILLIGRAM(S): at 06:32

## 2023-09-11 RX ADMIN — CELECOXIB 200 MILLIGRAM(S): 200 CAPSULE ORAL at 06:31

## 2023-09-11 RX ADMIN — Medication 650 MILLIGRAM(S): at 06:31

## 2023-09-11 NOTE — DISCHARGE NOTE PROVIDER - HOSPITAL COURSE
83 y/o male w/ No PMH but w/ daily alcohol use being admitted for treatment of R femoral neck fracture. Had right YULISA on 9/10. Patient tolerated procedure well. No complaints. Says pain ist stable. Stable for d/c home with home care.

## 2023-09-11 NOTE — PHYSICAL THERAPY INITIAL EVALUATION ADULT - ACTIVE RANGE OF MOTION EXAMINATION, REHAB EVAL
RLE required AAROM/AROM with (R) hip flexion 0-85 degrees and (R) hip  abduction 0-25 degrees, in supine Please refer to OT eval for BUE/Left LE Active ROM was WFL (within functional limits)

## 2023-09-11 NOTE — DISCHARGE NOTE PROVIDER - NSDCMRMEDTOKEN_GEN_ALL_CORE_FT
acetaminophen 325 mg oral tablet: 2 tab(s) orally every 6 hours  Ultram 50 mg oral tablet: 1 tab(s) orally every 4 hours as needed for  moderate pain MDD: 6 tabs   acetaminophen 325 mg oral tablet: 2 tab(s) orally every 6 hours  aspirin 81 mg oral tablet: 1 tab(s) orally 2 times a day

## 2023-09-11 NOTE — OCCUPATIONAL THERAPY INITIAL EVALUATION ADULT - PERTINENT HX OF CURRENT PROBLEM, REHAB EVAL
s/p mechanical fall; Pt admitted for right Femoral neck fracture; Right YULISA, direct anterior approach, regional anesthesia on 9/10/23.

## 2023-09-11 NOTE — PHYSICAL THERAPY INITIAL EVALUATION ADULT - ADDITIONAL COMMENTS
Per patient, they live in private home with 12 steps outside with 2 rails and 7 steps/platform / 7 steps inside with 2 rails for 80% of steps and wal on (L) side on 25%; was not using any assistive device

## 2023-09-11 NOTE — PHYSICAL THERAPY INITIAL EVALUATION ADULT - MANUAL MUSCLE TESTING RESULTS, REHAB EVAL
LLE ms strength grossly graded 3+ to 4-/5; (R) hip 3-/5; (R) knee/ankle 3 to 3+/5; Please refer to OT jaswinder for BUE 18

## 2023-09-11 NOTE — PHYSICAL THERAPY INITIAL EVALUATION ADULT - GAIT DEVIATIONS NOTED, PT EVAL
stooped posture, dec heel strike/pushoff / stance slightly on RLE with (R0 foot slighltly inverted/decreased socrates/decreased step length/decreased weight-shifting ability

## 2023-09-11 NOTE — PHYSICAL THERAPY INITIAL EVALUATION ADULT - GENERAL OBSERVATIONS, REHAB EVAL
10:38-11:08 Chart reviewed. Pt encountered sitting in chair, may be seen by Physical Therapist as confirmed with Nurse. Patient denied pain and ready for therapy now; +Aquacel (R) hip/compression james

## 2023-09-11 NOTE — CHART NOTE - NSCHARTNOTEFT_GEN_A_CORE
Patient requires a rolling walker for d/c to home due to diagnosis of : R femoral fracture s/p R total hip arthroplasty  Patient requires a bedside commode for d/c home due to patient is at risk to fall and cannot walk safely to the bathroom.

## 2023-09-11 NOTE — OCCUPATIONAL THERAPY INITIAL EVALUATION ADULT - GENERAL OBSERVATIONS, REHAB EVAL
10:00-10:37 Chart reviewed, ok to treat by Occupational Therapist as confirmed by RN Maggy, Pt received semi-crespo's in bed +aquacel right hip Minimal drainage in NAD. Pt in agreement with OT IE.

## 2023-09-11 NOTE — DISCHARGE NOTE NURSING/CASE MANAGEMENT/SOCIAL WORK - PATIENT PORTAL LINK FT
You can access the FollowMyHealth Patient Portal offered by [facility] by registering at the following website: [email or web address]. By joining [employer]’s FollowMyHealth portal, you will also be able to view your health information using other applications (apps) compatible with our system.

## 2023-09-11 NOTE — DISCHARGE NOTE NURSING/CASE MANAGEMENT/SOCIAL WORK - NSDCPEFALRISK_GEN_ALL_CORE
For information on Fall & Injury Prevention, visit: [email or web address] OR  [email or web address] OR  https://www.cdc.gov/steadi/patient.html

## 2023-09-11 NOTE — DISCHARGE NOTE PROVIDER - CARE PROVIDER_API CALL
Fareed Garay  Orthopaedic Surgery  [address] [facility] [place] [address]  Phone: [phone]  Fax: [phone]  Follow Up Time: 2 weeks    Nedra Berg  Geriatric Medicine  [address] [place] [address]  Phone: [phone]  Fax: [phone]  Follow Up Time: 1 week

## 2023-09-11 NOTE — OCCUPATIONAL THERAPY INITIAL EVALUATION ADULT - ADDITIONAL COMMENTS
PLOF obtained from patient. (+) driving. Pt's daughter, son-in-law, and 3 yo grandson staying with him for ~6 weeks until they move to [place].

## 2023-09-11 NOTE — DISCHARGE NOTE PROVIDER - ATTENDING DISCHARGE PHYSICAL EXAMINATION:
Patient seen and examined at bedside. Had right YULISA last night. Says he has minimal to no hip pain. Worked with PT/OT. Stable for d/c home with home care. Needs to f/u with PMD and ortho as outpatient.      PHYSICAL EXAM:  GENERAL: NAD, lying in bed comfortably  HEAD:  Atraumatic, Normocephalic  EYES: EOMI, PERRLA, conjunctiva and sclera clear  ENT: Moist mucous membranes  NECK: Supple, No JVD  CHEST/LUNG: Clear to auscultation bilaterally; No rales, rhonchi, wheezing, or rubs. Unlabored respirations  HEART: Regular rate and rhythm; No murmurs, rubs, or gallops  ABDOMEN: Bowel sounds present; Soft, Nontender, Nondistended. No hepatomegaly  EXTREMITIES:  2+ Peripheral Pulses, brisk capillary refill. Right hip with dressing c/d/i, no bleeding.   NERVOUS SYSTEM:  Alert & Oriented X3, speech clear. No deficits   MSK: FROM all 4 extremities, full and equal strength  SKIN: No rashes or lesions    Consultant(s) Notes Reviewed:  [x ] YES  [ ] NO  Care Discussed with Consultants/Other Providers [ x] YES  [ ] NO    Vital Signs Last 24 Hrs  T(C): 36.3 (11 Sep 2023 05:05), Max: 37.2 (10 Sep 2023 16:04)  T(F): 97.3 (11 Sep 2023 05:05), Max: 99 (10 Sep 2023 16:04)  HR: 60 (11 Sep 2023 05:05) (60 - 94)  BP: 108/56 (11 Sep 2023 05:05) (102/50 - 139/70)  BP(mean): --  RR: 20 (11 Sep 2023 05:05) (18 - 20)  SpO2: 100% (11 Sep 2023 05:05) (91% - 100%)    Parameters below as of 10 Sep 2023 16:49  Patient On (Oxygen Delivery Method): nasal cannula  O2 Flow (L/min): 0.2

## 2023-09-11 NOTE — DISCHARGE NOTE PROVIDER - CARE PROVIDERS DIRECT ADDRESSES
[email or web address]

## 2023-09-11 NOTE — DISCHARGE NOTE PROVIDER - NSDCCPCAREPLAN_GEN_ALL_CORE_FT
PRINCIPAL DISCHARGE DIAGNOSIS  Diagnosis: Closed displaced fracture of right femoral neck  Assessment and Plan of Treatment: You had a femur fracture that was surgically repaired with a total hip arthroplasty. You will get home physical therapy. Please take aspirin 81mg twice a day for 30 days to prevent blood clots. Follow up with your orthopedist as outpatient.      SECONDARY DISCHARGE DIAGNOSES  Diagnosis: Fall at home, initial encounter  Assessment and Plan of Treatment:

## 2023-09-11 NOTE — DISCHARGE NOTE PROVIDER - PROVIDER TOKENS
PROVIDER:[TOKEN:[[id number]],FOLLOWUP:[2 weeks]],PROVIDER:[TOKEN:[[id number]],FOLLOWUP:[1 week]]

## 2023-09-13 PROBLEM — Z78.9 OTHER SPECIFIED HEALTH STATUS: Chronic | Status: ACTIVE | Noted: 2023-09-09
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
## 2023-09-15 DIAGNOSIS — Z41.8 ENCOUNTER FOR OTHER PROCEDURES FOR PURPOSES OTHER THAN REMEDYING HEALTH STATE: ICD-10-CM
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
## 2023-09-15 DIAGNOSIS — S72.001A FRACTURE OF UNSPECIFIED PART OF NECK OF RIGHT FEMUR, INITIAL ENCOUNTER FOR CLOSED FRACTURE: ICD-10-CM

## 2023-09-15 DIAGNOSIS — F10.90 ALCOHOL USE, UNSPECIFIED, UNCOMPLICATED: ICD-10-CM

## 2023-09-28 ENCOUNTER — RESULT CHARGE (OUTPATIENT)
Age: 83
End: 2023-09-28

## 2023-09-28 ENCOUNTER — APPOINTMENT (OUTPATIENT)
Dept: ORTHOPEDIC SURGERY | Facility: CLINIC | Age: 83
End: 2023-09-28
Payer: MEDICARE

## 2023-09-28 DIAGNOSIS — Z96.641 PRESENCE OF RIGHT ARTIFICIAL HIP JOINT: ICD-10-CM

## 2023-09-28 PROCEDURE — 99024 POSTOP FOLLOW-UP VISIT: CPT

## 2023-09-28 PROCEDURE — 73521 X-RAY EXAM HIPS BI 2 VIEWS: CPT

## 2024-01-02 ENCOUNTER — APPOINTMENT (OUTPATIENT)
Dept: ORTHOPEDIC SURGERY | Facility: CLINIC | Age: 84
End: 2024-01-02

## 2024-04-18 NOTE — PHYSICAL THERAPY INITIAL EVALUATION ADULT - WEIGHT-BEARING RESTRICTIONS: SIT/STAND, REHAB EVAL
Caller: Earl Marc    Relationship: Self    Best call back number: [phone]     What orders are you requesting (i.e. lab or imaging): WALKER WITH WHEELS IN THE FRONT THAT CAN BE FOLDED UP     In what timeframe would the patient need to come in: ASAP    Where will you receive your lab/imaging services: [facility]    Additional notes: PT SUGGESTED HE GET THE WALKER BECAUSE HE'S HAVING A HARD TIME WALKING            RLE/weight-bearing as tolerated